# Patient Record
Sex: FEMALE | Employment: FULL TIME | URBAN - METROPOLITAN AREA
[De-identification: names, ages, dates, MRNs, and addresses within clinical notes are randomized per-mention and may not be internally consistent; named-entity substitution may affect disease eponyms.]

---

## 2018-09-14 ENCOUNTER — HOSPITAL ENCOUNTER (OUTPATIENT)
Dept: NUTRITION | Age: 43
Discharge: HOME | End: 2018-09-14
Payer: SELF-PAY

## 2018-09-14 PROCEDURE — 97802 MEDICAL NUTRITION INDIV IN: CPT

## 2018-09-20 NOTE — PROGRESS NOTES
NUTRITION ASSESSMENT:    FOOD/NUTRITION HISTORY:   Pt eats 3 meals/day with CHO snacks. Diet appears low in calcium-containing choices, low in servings of fruits and vegetables, low in fiber, low in protein, and excessive in kcal though portions not noted. Diet appears low to none in heart-healthy fats. Pt dines out . Pt drinks 48 oz water/day, 4-6 (12 oz) glasses of sweetened tea/day (a possible 700kcal/day), 32oz diet soda/day. Alcohol intake noted @  none. Pt has an exercise regime of none. States time is a barrier. Pt appears able to obtain appropriate nutritional choices as desired. Support:   may provide some support through assisting with tasks at home (address time barrier). 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 \"when I have to plan I get over-whelmed. \"    Motivations for change:  Pt states that she had a recent experience of chest aching- teeth hurt as well- and went to MD for fear of MI (family history of heart disease). Notes it was a sinus infection but she realized that she is  \"getting concerned\" about the family hx of heart disease. Notes \"I just want to be healthy. \"  Notes she'd like to model healthy behaviors for her son. Current Medical Diagnosis:   BMI 35.0-35.9    History of Current Illness:   Pt is referred by Inessa BLACKMON. Is not covered and agrees to self-pay rates. Nutritionally Pertinent Past Medical History:   Pt is  and employed full time. Family history of uncle with MI @ 50years old and her father with a MI @ 64years old. Sleep patterns:   6 hours / night. Socioeconomic status/ current living conditions:  Pt is  and employed full time. Has a son. Medical Procedures, Lab Results, Test Results:   Labs:      Nutritionally Relevant Medications:  Unremarkable. Supplements/Vitamins/Herbs:   None noted.     ANTHROPOMETRIC DATA:  Ht: 5'6 (1.676 m)  Wt: 217lb 12.8oz  (98.8 kg) per EMR  BMI: 35.2 (Obesity Class III)  IBW: 130# +/- 10%   Any recent wt loss or gain:  Weight history in EMR  WT / BMI WEIGHT BODY MASS INDEX   8/24/2018 217 lb 12.8 oz 35.15 kg/m2   3/16/2018 213 lb 9.6 oz 34.48 kg/m2   10/25/2017 207 lb 6.4 oz 33.48 kg/m2   8/8/2017 213 lb 34.38 kg/m2   2/16/2017 220 lb 6.4 oz 36.68 kg/m2   1/9/2017 219 lb 36.44 kg/m2   11/22/2016 223 lb 37.11 kg/m2     Estimated Nutritional Needs to Maintain Current Wt using [place] [address]. Jeor Equation, activity factor of 1.2:   1994 kcal/day +/- 10% MSJ margin of error    EER for weight loss: 2000 - 500 = 1500 kcal/day  Carbohydrates: 180gm/day (50% of kcal) or 3 gm/meal(3) with 30 gm/snack(1) (Approximately 475-500 kcal/meal with 180 kcal/snack)  Protein: 100gm/day (25% of kcal)  Fat: 53 gm/day (30% of kcal) Heart- healthy fats emphasized with lists given. Fluids: 1.5-2 L/day (1 ml/kcal)    NUTRITION DIAGNOSIS:   Obesity related to excessive portions and use of sweetened beverages with minimal physical activtiy level as evidecend by BMI, recall of po intake, and physical activity level (as above). NUTRITION INTERVENTION:  Appointment length: 60 minutes. Nutrition Education:  Purpose of nutrition education: Provide pt with a guide for appropriate macronutrient portions and educate pt on healthy food choices to promote weight management and health management. Recommended modifications:   · To address time barriers, recommended pt try the simply Mediterranean style menu provided and use extra portions prepared in the evenings for lunch meals. · Recommended pt check food products purchased for trans fats. Showed pt examples with labels in office. Gave handout guide. Skill development:  · Provided pt with MyPlate meal template and educated pt on how to balance macronutrients and approprimate portion sizes. Pt reviewed 2 example meals in office. Pt was attentive and asked appropriate questions.  Expect that some attempts will be made to follow the meal plan template. Nutrition Counseling: Motivational Interviewing:    Discussed pts intake and activity patterns as above. Reviewed 3 broad areas that impact weight: food choices, physical activity level, and energy balance. Advised that weight management should be viewed as a learning process and making small goals that pt can continue with for life will facilitate weight loss and maintenance.  Discussed any successful attempts to lose any wt in the past. Pt notes Weight Watchers, without success. Advised pt on the qualities of individuals who are able to lose weight and maintain that wt loss. Highlighted that maintainers tend to continue in the dietary patterns and physical activity patterns they established in order to lose the weight. Advised designing an approach with small goals that pt can practically plan on continuing for life (new behaviors). Encouraged a focus on healthful intake and to avoid a focus on weight numbers. Self-monitoring:   Advised pt that goals of this program are to educate pt an appropriate intake and nutrition planning for healthful intake- to address barriers toplans, and to develop self- monitoring, self-regulating behaviors for life-long management skills. Advised pt that lifestyle changes will usually be required. Stressed that changing behaviors is a trial and error process and support with a health behavior change  (such as with appts here) will likely be beneficial for successful outcome. Pt voiced good understanding. Goal Setting: Pt agreed to the following goals. Goal: Pt will reduce caloric intake and structure eating patterns and food choices to promote health and subsequent weight reduction.   Plan: Pt will use My Meal Plan and Steps to Your Health along with other materials given as guides and support to structure eating in timing, food choices, macronutrient amounts, and portion sizes for goal stated (as above). Goal: Pt will increase frequency and consistency of physical activity to facilitate wt loss. Plan: Pt will walk around the mall at work for 10-30 minutes at lunch @ 5 days/week. Goal: Pt will increase time of sleep from 6 hours to 7-8 hours. Plan: Pt will set a schedule for bedtime: set alarm for reminders as bedtime approaches and will get  to assist with chores/ son before bedtime. Materials Given:  MyPlate meal template  Meal Planner Sheets  Goal Setting Sheet  Mediterranean Diet Handout- food list, sample meals, and grocery list  Goal Setting- pt selected action plan form  Trans Fatsalls  Fat Basics   Topics for Weight Loss Nutrition Counseling  Complete Health Details      MONITORING AND EVALUATION DETAILS:  Follow up appt: 10/5/18 @ 10:00am.  Follow-up Monitoring Plans: Will monitor any wt change if pt agrees to weight measurement. Will assess and address any barriers to or success with plans. Will review meal recalls to compare with meal plan. Discuss sweetened beverage intake.     Shahida Vazquez MS, RD,CSSD, LD  W: [phone]  C: [phone]

## 2018-10-04 ENCOUNTER — DOCUMENTATION ONLY (OUTPATIENT)
Dept: NUTRITION | Age: 43
End: 2018-10-04

## 2018-10-04 NOTE — PROGRESS NOTES
Nutrition Counseling:  Pt referred by Mack BLACKMON. Giorgio Paz  Pt emailed to r/s her f/u appt to 11/02/18 @ 10:00am.    Maggy Herrera Moiz [address], [address], [address], [place]  W: [phone]  C: [phone]

## 2018-10-05 ENCOUNTER — APPOINTMENT (OUTPATIENT)
Dept: NUTRITION | Age: 43
End: 2018-10-05

## 2018-11-02 ENCOUNTER — HOSPITAL ENCOUNTER (OUTPATIENT)
Dept: NUTRITION | Age: 43
Discharge: HOME | End: 2018-11-02
Payer: SELF-PAY

## 2018-11-02 PROCEDURE — 97803 MED NUTRITION INDIV SUBSEQ: CPT

## 2018-11-04 NOTE — PROGRESS NOTES
Problem: Nutrition Deficit   Goal: *Optimize nutritional status     NUTRITION FOLLOW UP:    Monitoring of weight:  Actual BW: Pt declined to weight. Weight Loss: Unable to determine. ASSESSMENT of Interventions:   Goal Setting: Pt agreed to the following goals. Goal: Pt will reduce caloric intake and structure eating patterns and food choices to promote health and subsequent weight reduction. Plan: Pt will use MyPlate template along with other materials given as guides and support to structure eating in timing, food choices, macronutrient amounts, and portion sizes for goal stated (as above). Progress: Pt states that she di not follow MyPlate template very well. Describes as noticing that she eats more protein and more carbohydrates than she should. Does not that she made steps to add more non-starchy vegetables to fill half her plate. States she ate salmon 1-2 times/week. Goal: Pt will increase frequency and consistency of physical activity to facilitate wt loss. Plan: Pt will walk around the mall at work for 10-30 minutes at lunch @ 5 days/week. Progress:Pt states she is walking at work now for 30 minutes @ 4 days/week,    Goal: Pt will increase time of sleep from 6 hours to 7-8 hours. Plan: Pt will set a schedule for bedtime: set alarm for reminders as bedtime approaches and will get  to assist with chores/ son before bedtime. Progress: Pt has not been able to change the number of hours that she sleeps. States there is just too many details with caring for home and her son after work. NUTRITION DIAGNOSIS: Initial  Obesity related to excessive portions and use of sweetened beverages with minimal physical activtiy level as evidecend by BMI, recall of po intake, and physical activity level (as above).       INTERVENTION:  Appointment length: 45 minutes     Motivational Interviewing:  · Discussed with pt why she feels that she is eating more protein and more CHO than noted on the meal template. Pt is unsure. States she feels that after one of her pregnancies she fell into excessive eating habits. · Recalls having a pregnancy that she lost in the last trimester. States that she had very bad nausea with her pregnancy and needed to eat often to keep the nausea down. · States she found out through testing that her pregnancy would eventually terminate due to a genetic disease and the time was very difficult for her. States she has had grief counseling in the past.  · Encouraged pt to continue with counseling if she feels that her experience with that loss and po intake may have some connection. Advised pt of counselors in [facility]. Pt declines any counseling needs at this time. · Pt feels that she may just need to have more specific plans for appropriate portions. Would like to try to follow a meal plan first.  Notes that she \"is a planner\" and does not mind the structure to re-learn portions. Nutrition Education:  · Explained to pt how to use the food guide and the meal plan using models and measuring cups to demonstrate appropriate portion sizes. Pt was attentive and asked appropriate questions. Composed 2 sample meals in office. Expect that some attempts will be made to follow the meal plan. Meals and Snacks:  · 1500 kcal/day meal plan    Goal Setting:  Goal: Pt will eat appropriate portions of macronutrient at meals/snacks. Plan: Pt will use My Meal Plan and meal planner sheets to plan for balanced meals and to provide self-accountability. MONITORING/EVALUATION:   F/U Appointment Schedule: 11/30/18 @ 10:00am.    Plans: Will monitor any wt change. Will assess and address any barriers to or success with plans.       Jhoan Elizondo, MS, RD, CSSD, LD  Outpatient Dietitian  [address] [place] [address]  [phone]

## 2018-11-27 ENCOUNTER — DOCUMENTATION ONLY (OUTPATIENT)
Dept: NUTRITION | Age: 43
End: 2018-11-27

## 2018-11-27 NOTE — PROGRESS NOTES
Nutrition Counseling:  Pt referred by provider Valentina Herrmann. Pt emailed to cancel her appt this month due to busy season. States she will reach out in January to reschedule.     Angelique Malachi, MS, [address], [address] [place], [place]  W: [phone]  C: [phone]

## 2019-02-18 ENCOUNTER — DOCUMENTATION ONLY (OUTPATIENT)
Dept: NUTRITION | Age: 44
End: 2019-02-18

## 2019-12-24 PROBLEM — D05.01 LOBULAR CARCINOMA IN SITU (LCIS) OF RIGHT BREAST: Status: ACTIVE | Noted: 2019-07-18

## 2022-03-19 PROBLEM — D05.01 LOBULAR CARCINOMA IN SITU (LCIS) OF RIGHT BREAST: Status: ACTIVE | Noted: 2019-07-18

## 2022-07-12 ENCOUNTER — OFFICE VISIT (OUTPATIENT)
Dept: ORTHOPEDIC SURGERY | Age: 47
End: 2022-07-12

## 2022-07-12 VITALS — HEIGHT: 64 IN | WEIGHT: 220 LBS | BODY MASS INDEX: 37.56 KG/M2

## 2022-07-12 DIAGNOSIS — M25.561 RIGHT KNEE PAIN, UNSPECIFIED CHRONICITY: Primary | ICD-10-CM

## 2022-07-12 PROCEDURE — 99203 OFFICE O/P NEW LOW 30 MIN: CPT | Performed by: ORTHOPAEDIC SURGERY

## 2022-07-12 NOTE — PROGRESS NOTES
Name: Kala Isidro  YOB: 1975  Gender: female  MRN: [id number]      CC: Knee Pain (R knee)       HPI: Kala Isidro is a 52 y.o. female who presents with Knee Pain (R knee)  Roly Lopez is a new patient who presents today with right knee pain. She reports doing some extensive walking at her son's field day about 6 weeks and felt some soreness after that. Later that same day she was twisting when she felt an acute pop in her knee. She had pretty severe pain for about 4 days but it has improved some. She denies any swelling. She has been icing the knee since then and is able to ambulate without issue now. She continues to have pain with squatting twisting and stairs she denies any prior history of knee pain in the past.         ROS/Meds/PSH/PMH/FH/SH: I personally reviewed the patients standard intake form. Below are the pertinents    Tobacco:  reports that she has never smoked. She has never used smokeless tobacco.  Diabetes: none  Other: depression    Physical Examination:  General: no acute distress  Lungs: breathing easily  CV: regular rhythm by pulse  Right Knee: Tenderness to palpation over the lateral joint line. She has genu valgum alignment. She has pain at the extremes of motion over the lateral joint line. Pain with Rocky's over the lateral joint line with recreation of symptoms. Negative Lachman's negative anterior posterior drawer motor and sensory function intact throughout. Imaging:   Knee XR: 4 views     Clinical Indication  1. Right knee pain, unspecified chronicity           Report: AP, lateral, PA flexion, sunrise views of the Right knee demonstrates no acute fracture dislocation, or advanced degenerative changes    Impression: No acute findings as above           All imaging interpreted by myself Ryan Jensen MD independent of radiology review        Assessment:     ICD-10-CM    1.  Right knee pain, unspecified chronicity  M25.561 XR KNEE RIGHT (MIN 4 VIEWS) MRI KNEE RIGHT WO CONTRAST       Plan:   Concern for internal derangement specifically meniscal pathology likely lateral.  She has persistent symptoms 6 weeks later despite ice anti-inflammatories and activity modification and quad strengthening. Recommend an MRI scan to rule out meniscal pathology. We will see her back after the MRI to make a definitive treatment plan              Ryan Kurtz MD, [address] [place] [address] and Sports Medicine

## 2022-07-26 ENCOUNTER — OFFICE VISIT (OUTPATIENT)
Dept: ORTHOPEDIC SURGERY | Age: 47
End: 2022-07-26
Payer: COMMERCIAL

## 2022-07-26 DIAGNOSIS — M94.261 CHONDROMALACIA, KNEE, RIGHT: Primary | ICD-10-CM

## 2022-07-26 DIAGNOSIS — M25.561 RIGHT KNEE PAIN, UNSPECIFIED CHRONICITY: ICD-10-CM

## 2022-07-26 DIAGNOSIS — M76.31 IT BAND SYNDROME, RIGHT: ICD-10-CM

## 2022-07-26 PROCEDURE — 99213 OFFICE O/P EST LOW 20 MIN: CPT | Performed by: ORTHOPAEDIC SURGERY

## 2022-07-26 RX ORDER — METHYLPREDNISOLONE 4 MG/1
TABLET ORAL
Qty: 1 KIT | Refills: 0 | Status: SHIPPED | OUTPATIENT
Start: 2022-07-26 | End: 2022-07-26 | Stop reason: SDUPTHER

## 2022-07-26 RX ORDER — METHYLPREDNISOLONE 4 MG/1
TABLET ORAL
Qty: 1 KIT | Refills: 0 | Status: SHIPPED | OUTPATIENT
Start: 2022-07-26

## 2022-10-31 ENCOUNTER — PREP FOR PROCEDURE (OUTPATIENT)
Dept: ADMINISTRATIVE | Age: 47
End: 2022-10-31

## 2022-12-13 RX ORDER — SODIUM CHLORIDE 9 MG/ML
INJECTION, SOLUTION INTRAVENOUS PRN
Status: CANCELLED | OUTPATIENT
Start: 2022-12-13

## 2022-12-13 RX ORDER — SODIUM CHLORIDE 0.9 % (FLUSH) 0.9 %
5-40 SYRINGE (ML) INJECTION EVERY 12 HOURS SCHEDULED
Status: CANCELLED | OUTPATIENT
Start: 2022-12-13

## 2022-12-13 RX ORDER — SODIUM CHLORIDE 0.9 % (FLUSH) 0.9 %
5-40 SYRINGE (ML) INJECTION PRN
Status: CANCELLED | OUTPATIENT
Start: 2022-12-13

## 2022-12-19 NOTE — PROGRESS NOTES
Patient verified name, , and procedure. Type: 1a; abbreviated assessment per anesthesia guidelines    Labs per anesthesia: NONE    Instructed pt that they will be notified the day before their procedure by the GI Lab for time of arrival if [facility] and [facility] for [place] cases. Arrival times should be called by 5 pm. If no phone is received the patient should contact their respective hospital. The GI lab telephone number is [phone] and ES Pre-op is [phone]. Follow diet and prep instructions per office including NPO status. If patient has NOT received instructions from office patient is advised to call surgeon office, verbalizes understanding. Bath or shower the night before and the am of surgery with non-mositurizing soap. No lotions, oils, powders, cologne on skin. No make up, eye make up or jewelry. Wear loose fitting comfortable, clean clothing. Must have adult present in building the entire time.      Medications for the day of procedure Loratadine, patient to hold: NONE

## 2022-12-21 ENCOUNTER — ANESTHESIA EVENT (OUTPATIENT)
Dept: ENDOSCOPY | Age: 47
End: 2022-12-21
Payer: COMMERCIAL

## 2022-12-21 RX ORDER — SODIUM CHLORIDE 0.9 % (FLUSH) 0.9 %
5-40 SYRINGE (ML) INJECTION EVERY 12 HOURS SCHEDULED
Status: CANCELLED | OUTPATIENT
Start: 2022-12-21

## 2022-12-21 RX ORDER — SODIUM CHLORIDE 0.9 % (FLUSH) 0.9 %
5-40 SYRINGE (ML) INJECTION PRN
Status: CANCELLED | OUTPATIENT
Start: 2022-12-21

## 2022-12-21 RX ORDER — DEXTROSE MONOHYDRATE 100 MG/ML
INJECTION, SOLUTION INTRAVENOUS CONTINUOUS PRN
Status: CANCELLED | OUTPATIENT
Start: 2022-12-21

## 2022-12-21 RX ORDER — SODIUM CHLORIDE, SODIUM LACTATE, POTASSIUM CHLORIDE, CALCIUM CHLORIDE 600; 310; 30; 20 MG/100ML; MG/100ML; MG/100ML; MG/100ML
INJECTION, SOLUTION INTRAVENOUS CONTINUOUS
Status: CANCELLED | OUTPATIENT
Start: 2022-12-21

## 2022-12-21 RX ORDER — SODIUM CHLORIDE 9 MG/ML
INJECTION, SOLUTION INTRAVENOUS PRN
Status: CANCELLED | OUTPATIENT
Start: 2022-12-21

## 2022-12-22 ENCOUNTER — HOSPITAL ENCOUNTER (OUTPATIENT)
Age: 47
Setting detail: OUTPATIENT SURGERY
Discharge: HOME | End: 2022-12-22
Attending: INTERNAL MEDICINE | Admitting: INTERNAL MEDICINE
Payer: COMMERCIAL

## 2022-12-22 ENCOUNTER — ANESTHESIA (OUTPATIENT)
Dept: ENDOSCOPY | Age: 47
End: 2022-12-22
Payer: COMMERCIAL

## 2022-12-22 VITALS
SYSTOLIC BLOOD PRESSURE: 142 MMHG | RESPIRATION RATE: 14 BRPM | TEMPERATURE: 98.6 F | OXYGEN SATURATION: 98 % | WEIGHT: 220 LBS | BODY MASS INDEX: 36.65 KG/M2 | HEART RATE: 90 BPM | HEIGHT: 65 IN | DIASTOLIC BLOOD PRESSURE: 69 MMHG

## 2022-12-22 LAB — HCG UR QL: NEGATIVE

## 2022-12-22 PROCEDURE — 6360000002 HC RX W HCPCS

## 2022-12-22 PROCEDURE — 3609027000 HC COLONOSCOPY: Performed by: INTERNAL MEDICINE

## 2022-12-22 PROCEDURE — 3700000001 HC ADD 15 MINUTES (ANESTHESIA): Performed by: INTERNAL MEDICINE

## 2022-12-22 PROCEDURE — 3700000000 HC ANESTHESIA ATTENDED CARE: Performed by: INTERNAL MEDICINE

## 2022-12-22 PROCEDURE — 2580000003 HC RX 258: Performed by: ANESTHESIOLOGY

## 2022-12-22 PROCEDURE — 7100000011 HC PHASE II RECOVERY - ADDTL 15 MIN: Performed by: INTERNAL MEDICINE

## 2022-12-22 PROCEDURE — 2500000003 HC RX 250 WO HCPCS

## 2022-12-22 PROCEDURE — 81025 URINE PREGNANCY TEST: CPT

## 2022-12-22 PROCEDURE — 7100000010 HC PHASE II RECOVERY - FIRST 15 MIN: Performed by: INTERNAL MEDICINE

## 2022-12-22 PROCEDURE — 2709999900 HC NON-CHARGEABLE SUPPLY: Performed by: INTERNAL MEDICINE

## 2022-12-22 RX ORDER — LIDOCAINE HYDROCHLORIDE 20 MG/ML
INJECTION, SOLUTION EPIDURAL; INFILTRATION; INTRACAUDAL; PERINEURAL PRN
Status: DISCONTINUED | OUTPATIENT
Start: 2022-12-22 | End: 2022-12-22 | Stop reason: SDUPTHER

## 2022-12-22 RX ORDER — SODIUM CHLORIDE 0.9 % (FLUSH) 0.9 %
5-40 SYRINGE (ML) INJECTION EVERY 12 HOURS SCHEDULED
Status: DISCONTINUED | OUTPATIENT
Start: 2022-12-22 | End: 2022-12-22 | Stop reason: HOSPADM

## 2022-12-22 RX ORDER — SODIUM CHLORIDE 0.9 % (FLUSH) 0.9 %
5-40 SYRINGE (ML) INJECTION PRN
Status: DISCONTINUED | OUTPATIENT
Start: 2022-12-22 | End: 2022-12-22 | Stop reason: HOSPADM

## 2022-12-22 RX ORDER — PROPOFOL 10 MG/ML
INJECTION, EMULSION INTRAVENOUS PRN
Status: DISCONTINUED | OUTPATIENT
Start: 2022-12-22 | End: 2022-12-22 | Stop reason: SDUPTHER

## 2022-12-22 RX ORDER — SODIUM CHLORIDE, SODIUM LACTATE, POTASSIUM CHLORIDE, CALCIUM CHLORIDE 600; 310; 30; 20 MG/100ML; MG/100ML; MG/100ML; MG/100ML
INJECTION, SOLUTION INTRAVENOUS CONTINUOUS
Status: DISCONTINUED | OUTPATIENT
Start: 2022-12-22 | End: 2022-12-22 | Stop reason: HOSPADM

## 2022-12-22 RX ORDER — PROPOFOL 10 MG/ML
INJECTION, EMULSION INTRAVENOUS CONTINUOUS PRN
Status: DISCONTINUED | OUTPATIENT
Start: 2022-12-22 | End: 2022-12-22 | Stop reason: SDUPTHER

## 2022-12-22 RX ORDER — SODIUM CHLORIDE 9 MG/ML
INJECTION, SOLUTION INTRAVENOUS PRN
Status: DISCONTINUED | OUTPATIENT
Start: 2022-12-22 | End: 2022-12-22 | Stop reason: HOSPADM

## 2022-12-22 RX ORDER — LIDOCAINE HYDROCHLORIDE 10 MG/ML
1 INJECTION, SOLUTION INFILTRATION; PERINEURAL
Status: DISCONTINUED | OUTPATIENT
Start: 2022-12-22 | End: 2022-12-22 | Stop reason: HOSPADM

## 2022-12-22 RX ADMIN — PROPOFOL 20 MG: 10 INJECTION, EMULSION INTRAVENOUS at 10:17

## 2022-12-22 RX ADMIN — PROPOFOL 50 MG: 10 INJECTION, EMULSION INTRAVENOUS at 10:15

## 2022-12-22 RX ADMIN — SODIUM CHLORIDE, POTASSIUM CHLORIDE, SODIUM LACTATE AND CALCIUM CHLORIDE: 600; 310; 30; 20 INJECTION, SOLUTION INTRAVENOUS at 09:45

## 2022-12-22 RX ADMIN — PROPOFOL 30 MG: 10 INJECTION, EMULSION INTRAVENOUS at 10:16

## 2022-12-22 RX ADMIN — PROPOFOL 200 MCG/KG/MIN: 10 INJECTION, EMULSION INTRAVENOUS at 10:14

## 2022-12-22 RX ADMIN — PROPOFOL 50 MG: 10 INJECTION, EMULSION INTRAVENOUS at 10:14

## 2022-12-22 RX ADMIN — LIDOCAINE HYDROCHLORIDE 60 MG: 20 INJECTION, SOLUTION EPIDURAL; INFILTRATION; INTRACAUDAL; PERINEURAL at 10:14

## 2022-12-22 ASSESSMENT — PAIN - FUNCTIONAL ASSESSMENT
PAIN_FUNCTIONAL_ASSESSMENT: NONE - DENIES PAIN

## 2022-12-22 NOTE — H&P
Chief complaint/HPI and PMH:  OA colo for screening with family hx of colon polyps and cancer. Today's exam:  LUNGS:  Clear and equal.  COR:  RRR without murmurs heard. NEURO:  A and Oriented fully. I have thoroughly explained the preparation, procedure and sedation process to the patient as well as the risks and alternatives. They will sign informed consent prior to the procedure.         Esme Fernández MD

## 2022-12-22 NOTE — ANESTHESIA PRE PROCEDURE
Department of Anesthesiology  Preprocedure Note       Name:  Ed Clifford   Age:  52 y.o.  :  1975                                          MRN:  [id number]         Date:  2022      Surgeon: Marleni Fuentes):  Fallon Capone MD    Procedure: Procedure(s):  COLORECTAL CANCER SCREENING, NOT HIGH RISK/37    Medications prior to admission:   Prior to Admission medications    Medication Sig Start Date End Date Taking? Authorizing Provider   Multiple Vitamin (MULTIVITAMIN ADULT PO) Take by mouth every other day   Yes Historical Provider, MD   loratadine (CLARITIN) 10 MG tablet Take 10 mg by mouth at bedtime    Ar Automatic Reconciliation       Current medications:    No current facility-administered medications for this encounter. Allergies:  No Known Allergies    Problem List:    Patient Active Problem List   Diagnosis Code    Lobular carcinoma in situ (LCIS) of right breast D05.01       Past Medical History:        Diagnosis Date    Depression     H/O seasonal allergies        Past Surgical History:        Procedure Laterality Date    BREAST BIOPSY Right     GYN  ,     c/s x 2    HEENT  2007    LASIX EYE SURGERY. Social History:    Social History     Tobacco Use    Smoking status: Never    Smokeless tobacco: Never   Substance Use Topics    Alcohol use:  No                                Counseling given: Not Answered      Vital Signs (Current):   Vitals:    22 1342   Weight: 220 lb (99.8 kg)   Height: 5' 5\" (1.651 m)                                              BP Readings from Last 3 Encounters:   No data found for BP       NPO Status: Time of last liquid consumption: 1730                        Time of last solid consumption: 1730                        Date of last liquid consumption: 22                        Date of last solid food consumption: 22    BMI:   Wt Readings from Last 3 Encounters:   22 220 lb (99.8 kg)   22 220 lb (99.8 kg) Body mass index is 36.61 kg/m². CBC: No results found for: WBC, RBC, HGB, HCT, MCV, RDW, PLT    CMP: No results found for: NA, K, CL, CO2, BUN, CREATININE, GFRAA, AGRATIO, LABGLOM, GLUCOSE, GLU, PROT, CALCIUM, BILITOT, ALKPHOS, AST, ALT    POC Tests: No results for input(s): POCGLU, POCNA, POCK, POCCL, POCBUN, POCHEMO, POCHCT in the last 72 hours. Coags: No results found for: PROTIME, INR, APTT    HCG (If Applicable): No results found for: PREGTESTUR, PREGSERUM, HCG, HCGQUANT     ABGs: No results found for: PHART, PO2ART, NGQ5HOU, DBQ3NSM, BEART, O1KNRNOD     Type & Screen (If Applicable):  No results found for: LABABO, LABRH    Drug/Infectious Status (If Applicable):  No results found for: HIV, HEPCAB    COVID-19 Screening (If Applicable): No results found for: COVID19        Anesthesia Evaluation  Patient summary reviewed and Nursing notes reviewed no history of anesthetic complications:   Airway: Mallampati: III  TM distance: >3 FB   Neck ROM: full  Comment: Small mouth opening   Mouth opening: < 3 FB   Dental: normal exam         Pulmonary:Negative Pulmonary ROS breath sounds clear to auscultation                             Cardiovascular:Negative CV ROS  Exercise tolerance: good (>4 METS),           Rhythm: regular  Rate: normal                    Neuro/Psych:   (+) depression/anxiety             GI/Hepatic/Renal:   (+) GERD: well controlled,          ROS comment: obesity. Endo/Other:    (+) malignancy/cancer (breast). Abdominal:             Vascular: Other Findings:           Anesthesia Plan      TIVA     ASA 2       Induction: intravenous. Anesthetic plan and risks discussed with patient.                         José Miguel Dhillon MD   12/22/2022

## 2022-12-22 NOTE — DISCHARGE INSTRUCTIONS
Gastrointestinal Colonoscopy/Flexible Sigmoidoscopy - Lower Exam Discharge Instructions  Call Dr. Alicia Recio at  for any problems or questions. Contact the doctors office for follow up appointment as directed  Medication may cause drowsiness for several hours, therefore, do not drive or operate machinery for remainder of the day. No alcohol today. Do not make any important decisions such signing legal paperwork. Ordinarily, you may resume regular diet and activity after exam unless otherwise specified by your physician. Because of air put into your colon during exam, you may experience some abdominal distension, relieved by the passage of gas, for several hours. Contact your physician if you have any of the following:  Excessive amount of bleeding - large amount when having a bowel movement. Occasional specks of blood with bowel movement would not be unusual.  Severe abdominal pain  Fever or Chills  Any additional instructions: Follow up with the office for any problems  Repeat colonoscopy in 5 years        Instructions given to [employer] and other family members.

## 2022-12-22 NOTE — PROCEDURES
Colonoscopy Procedure Note    Procedure: Colonoscopy    Pre-operative Diagnosis: Screen  PGM with colon cancer  Father with colon polyps     Post-operative Diagnosis: Normal colonoscopy to the terminal ileum. Recommendations: The patient was advised not to drive today nor to make any important decisions. They may resume normal diet and medications unless otherwise instructed in recovery. Surveillance interval: 5Y    Anesthesia/Sedation: MAC,, (see separate report). Procedure Details:  Informed consent was obtained for the procedure, including anesthesia. Risks of perforation, hemorrhage, adverse drug reaction and aspiration were discussed. The patient was placed in the left lateral decubitus position. Based on the pre-procedure assessment, including review of the patient's medical history, medications, allergies, and review of systems, she had been deemed to be an appropriate candidate for this procedure. A rectal examination was performed. The colonoscope was inserted into the rectum and advanced under direct vision to the terminal ileum. The quality of the colonic preparation was adequate. A careful inspection was made as the colonoscope was withdrawn; findings and interventions are described below. Findings:   TERMINAL ILEUM: The terminal ileum was entered and appeared normal with a normal villous mucosa. COLON:  The colonic mucosa from the cecum to the rectum was carefully examined. The mucosa appeared normal with normal vascularity. There was no diverticulosis, inflammatory changes, mucosal polyps, raised lesions, vascular ectasias or abnormal pigmentation. ANUS/RECTUM: Anal exam reveals no masses or hemorrhoids, sphincter tone is normal. Rectal exam reveals no masses or hemorrhoids. Direct and retroflexed views of the rectum were normal without significant hemorrhoidal engorgement or inflammation, polyps or masses. EBL: 0cc's. PATH:  None.     Complications: None; patient tolerated the procedure well. Attending Attestation: I performed the procedure.     Mya Le MD

## 2022-12-22 NOTE — ANESTHESIA POSTPROCEDURE EVALUATION
Department of Anesthesiology  Postprocedure Note    Patient: Josue Malone  MRN: [id number]  YOB: 1975  Date of evaluation: 12/22/2022      Procedure Summary     Date: 12/22/22 Room / Location: [facility] ENDO 03 / [facility] ENDOSCOPY    Anesthesia Start: 1639 Anesthesia Stop: 2177    Procedure: COLORECTAL CANCER SCREENING, NOT HIGH RISK/37 Diagnosis:       Screen for colon cancer      (Screen for colon cancer [Z12.11])    Surgeons: Yana Dias MD Responsible Provider: Jazmin Golden MD    Anesthesia Type: TIVA ASA Status: 2          Anesthesia Type: TIVA    Jose Phase I: Jose Score: 10    Jose Phase II: Jose Score: 10      Anesthesia Post Evaluation    Patient location during evaluation: PACU  Patient participation: complete - patient participated  Level of consciousness: awake and alert  Airway patency: patent  Nausea: well controlled. Complications: no  Cardiovascular status: acceptable.   Respiratory status: acceptable  Hydration status: stable

## 2024-07-05 ENCOUNTER — HOSPITAL ENCOUNTER (OUTPATIENT)
Dept: MRI IMAGING | Age: 49
Discharge: HOME | End: 2024-07-05
Attending: FAMILY MEDICINE | Admitting: FAMILY MEDICINE
Payer: COMMERCIAL

## 2024-07-05 DIAGNOSIS — D05.01 LOBULAR CARCINOMA IN SITU OF RIGHT BREAST: ICD-10-CM

## 2024-07-05 PROCEDURE — C8908 MRI W/O FOL W/CONT, BREAST,: HCPCS

## 2024-07-05 PROCEDURE — A9579 GAD-BASE MR CONTRAST NOS,1ML: HCPCS | Performed by: SURGERY

## 2024-07-05 PROCEDURE — 6360000004 HC RX CONTRAST MEDICATION: Performed by: SURGERY

## 2024-07-05 RX ADMIN — GADOTERIDOL 20 ML: 279.3 INJECTION, SOLUTION INTRAVENOUS at 14:54

## 2024-09-13 ENCOUNTER — HOSPITAL ENCOUNTER (OUTPATIENT)
Dept: MAMMOGRAPHY | Age: 49
Discharge: HOME | End: 2024-09-16
Payer: COMMERCIAL

## 2024-09-13 DIAGNOSIS — Z12.31 ENCOUNTER FOR SCREENING MAMMOGRAM FOR MALIGNANT NEOPLASM OF BREAST: ICD-10-CM

## 2024-09-13 PROCEDURE — 77063 BREAST TOMOSYNTHESIS BI: CPT

## (undated) DEVICE — KENDALL RADIOLUCENT FOAM MONITORING ELECTRODE RECTANGULAR SHAPE: Brand: KENDALL

## (undated) DEVICE — GAUZE,SPONGE,4"X4",12PLY,WOVEN,NS,LF: Brand: MEDLINE

## (undated) DEVICE — YANKAUER,BULB TIP,W/O VENT,RIGID,STERILE: Brand: MEDLINE

## (undated) DEVICE — SYRINGE MED 10ML LUERLOCK TIP W/O SFTY DISP

## (undated) DEVICE — SYRINGE, LUER SLIP, STERILE, 60ML: Brand: MEDLINE

## (undated) DEVICE — NEEDLE SYR 18GA L1.5IN RED PLAS HUB S STL BLNT FILL W/O

## (undated) DEVICE — SYRINGE MED 3ML CLR PLAS STD N CTRL LUERLOCK TIP DISP

## (undated) DEVICE — SINGLE PORT MANIFOLD: Brand: NEPTUNE 2

## (undated) DEVICE — CONNECTOR TBNG OD5-7MM O2 END DISP

## (undated) DEVICE — LUBE JELLY FOIL PACK 1.4 OZ: Brand: MEDLINE INDUSTRIES, INC.

## (undated) DEVICE — CANNULA NSL ORAL AD FOR CAPNOFLEX CO2 O2 AIRLFE

## (undated) DEVICE — AIRLIFE™ OXYGEN TUBING 7 FEET (2.1 M) CRUSH RESISTANT OXYGEN TUBING, VINYL TIPPED: Brand: AIRLIFE™